# Patient Record
Sex: MALE | Race: WHITE
[De-identification: names, ages, dates, MRNs, and addresses within clinical notes are randomized per-mention and may not be internally consistent; named-entity substitution may affect disease eponyms.]

---

## 2019-12-27 NOTE — EDM.PDOC
ED HPI GENERAL MEDICAL PROBLEM





- General


Chief Complaint: Drug or Alcohol Abuse


Stated Complaint: OVERDOSE


Time Seen by Provider: 12/27/19 15:50


Source of Information: Reports: Patient, EMS


History Limitations: Reports: Altered Mental Status





- History of Present Illness


INITIAL COMMENTS - FREE TEXT/NARRATIVE: 





Patient is a 24-year-old male who was found with agonal and apneic breathing 

secondary to heroin overdose prior to arrival.  Patient was given bystander CPR 

before paramedic arrival.  Was given Narcan which she has reversed is apnea and 

loss of consciousness.  Patient is alert and oriented  in the emergency 

department and denies using any other drugs besides marijuana.  He denies any 

trauma.


Onset: Today, Sudden


Severity: Severe


Improves with: Reports: Other (Narcan)


Associated Symptoms: Reports: No Other Symptoms





- Related Data


 Allergies











Allergy/AdvReac Type Severity Reaction Status Date / Time


 


No Known Allergies Allergy   Verified 12/27/19 15:39











Home Meds: 


 Home Meds





. [No Known Home Meds]  12/27/19 [History]











Past Medical History





- Past Health History


Medical/Surgical History: Denies Medical/Surgical History





- Infectious Disease History


Infectious Disease History: Reports: None





Social & Family History





- Family History


Family Medical History: Noncontributory





- Tobacco Use


Smoking Status *Q: Current Every Day Smoker


Years of Tobacco use: 5


Packs/Tins Daily: 1





- Caffeine Use


Caffeine Use: Reports: Coffee





- Recreational Drug Use


Recreational Drug Use: Yes


Recreational Drug Type: Reports: Heroin, Marijuana/Hashish





ED ROS GENERAL





- Review of Systems


Review Of Systems: Comprehensive ROS is negative, except as noted in HPI.





- Physical Exam


Exam: See Below


Exam Limited By: No Limitations


General Appearance: Alert, No Apparent Distress


Head Exam: Atraumatic, Normocephalic


Neck: Normal Inspection


Respiratory/Chest: No Respiratory Distress, Lungs Clear, Normal Breath Sounds


Cardiovascular: Normal Peripheral Pulses, Regular Rate, Rhythm


GI/Abdominal: Normal Bowel Sounds, Soft, Non-Tender, No Organomegaly


Neuro Exam (Abbreviated): Alert, Oriented


Back Exam: Normal Inspection


Extremities: Normal Inspection


Psychiatric: Normal Affect, Normal Mood


Skin Exam: Warm, Dry, Intact





Course





- Vital Signs


Last Recorded V/S: 


 Last Vital Signs











Temp  35.3 C   12/27/19 15:40


 


Pulse  95   12/27/19 17:22


 


Resp  16   12/27/19 17:22


 


BP  129/56 L  12/27/19 17:22


 


Pulse Ox  98   12/27/19 17:22














- Orders/Labs/Meds


Orders: 


 Active Orders 24 hr











 Category Date Time Status


 


 DRUG SCREEN, URINE [URCHEM] Stat Lab  12/27/19 17:48 Received











Labs: 


 Laboratory Tests











  12/27/19 Range/Units





  15:43 


 


Ethyl Alcohol  <3  mg/dL











Meds: 


Medications














Discontinued Medications














Generic Name Dose Route Start Last Admin





  Trade Name Maria Ines  PRN Reason Stop Dose Admin


 


Ondansetron HCl  4 mg  12/27/19 15:57  12/27/19 16:05





  Zofran  IVPUSH  12/27/19 15:58  4 mg





  ONETIME ONE   Administration





     





     





     





     














- Re-Assessments/Exams


Free Text/Narrative Re-Assessment/Exam: 





12/27/19 17:58


Patient was observed for approximately 3 hours with no deterioration of his 

mental status.  Urging at this time to the care of his family who assured me 

they will watch him tonight.  Him to the ED if there is any change in his 

mental status.





Departure





- Departure


Time of Disposition: 17:59


Disposition: Home, Self-Care 01


Condition: Good


Clinical Impression: 


 Drug abuse, Accidental heroin overdose








- Discharge Information


Instructions:  Drug Overdose


Forms:  ED Department Discharge


Additional Instructions: 


The following information is given to patients seen in the emergency department 

who are being discharged to home. This information is to outline your options 

for follow-up care. We provide all patients seen in our emergency department 

with a follow-up referral.





The need for follow-up, as well as the timing and circumstances, are variable 

depending upon the specifics of your emergency department visit.





If you don't have a primary care physician on staff, we will provide you with a 

referral. We always advise you to contact your personal physician following an 

emergency department visit to inform them of the circumstance of the visit and 

for follow-up with them and/or the need for any referrals to a consulting 

specialist.





The emergency department will also refer you to a specialist when appropriate. 

This referral assures that you have the opportunity for follow-up care with a 

specialist. All of these measure are taken in an effort to provide you with 

optimal care, which includes your follow-up.





Under all circumstances we always encourage you to contact your private 

physician who remains a resource for coordinating your care. When calling for 

follow-up care, please make the office aware that this follow-up is from your 

recent emergency room visit. If for any reason you are refused follow-up, 

please contact the  Emergency 

Department at [phone] and asked to speak to the emergency department 

charge nurse.








Sepsis Event Note





- Evaluation


Sepsis Screening Result: No Definite Risk





- Focused Exam


Vital Signs: 


 Vital Signs











  Temp Pulse Resp BP Pulse Ox


 


 12/27/19 17:22   95  16  129/56 L  98


 


 12/27/19 16:05   85  16  123/71  99


 


 12/27/19 15:40  35.3 C  93  18  150/95 H  100











Date Exam was Performed: 12/27/19


Time Exam was Performed: 17:57





- My Orders


Last 24 Hours: 


My Active Orders





12/27/19 17:48


DRUG SCREEN, URINE [URCHEM] Stat 














- Assessment/Plan


Last 24 Hours: 


My Active Orders





12/27/19 17:48


DRUG SCREEN, URINE [URCHEM] Stat

## 2021-03-28 ENCOUNTER — HOSPITAL ENCOUNTER (EMERGENCY)
Dept: HOSPITAL 56 - MW.ED | Age: 26
Discharge: LEFT BEFORE DISCHARGE | End: 2021-03-28
Payer: COMMERCIAL

## 2021-03-28 ENCOUNTER — HOSPITAL ENCOUNTER (EMERGENCY)
Dept: HOSPITAL 56 - MW.ED | Age: 26
Discharge: HOME | End: 2021-03-28
Payer: COMMERCIAL

## 2021-03-28 DIAGNOSIS — S61.112A: Primary | ICD-10-CM

## 2021-03-28 DIAGNOSIS — Z88.4: ICD-10-CM

## 2021-03-28 DIAGNOSIS — Z72.0: ICD-10-CM

## 2021-03-28 DIAGNOSIS — S61.102A: Primary | ICD-10-CM

## 2021-03-28 DIAGNOSIS — Z23: ICD-10-CM
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
## 2021-03-28 PROCEDURE — 99283 EMERGENCY DEPT VISIT LOW MDM: CPT

## 2021-03-28 PROCEDURE — 11760 REPAIR OF NAIL BED: CPT

## 2021-03-28 NOTE — EDM.PDOC
ED HPI GENERAL MEDICAL PROBLEM





- General


Chief Complaint: Laceration


Stated Complaint: SMASHED RT THUMB


Time Seen by Provider: 03/28/21 20:42





- History of Present Illness


INITIAL COMMENTS - FREE TEXT/NARRATIVE: 





HISTORY AND PHYSICAL:





History of present illness:


This is a 25 old gentleman who presents ER today secondary to an injury to his 

left thumb that occurred while he was working on his vehicle.  Patient was seen 

here earlier today and had a negative x-ray.  Patient reports tetanus currently 

up-to-date.  Patient return to the ER but is yet to leave earlier today 

secondary to childcare issues.





Patient reports no neurological deficits.  Patient reports pain and discomfort 

isolated to his nailbed of his left thumb.





Review of systems: 


As per history of present illness and below otherwise all systems reviewed and 

negative.





Past medical history: 


As per history of present illness and as reviewed below otherwise 

noncontributory.





Surgical history: 


As per history of present illness and as reviewed below otherwise 

noncontributory.





Social history: 


No reported history of drug or alcohol abuse.





Family history: 


As per history of present illness and as reviewed below otherwise 

noncontributory.





Physical exam:





This patient was seen and evaluated during the 2020 SARS-CoV-2 novel coronavirus

pandemic period.  Community viral transmission is ongoing at time of this 

encounter and the emergency department is operating under pandemic response 

procedures.





Constitutional: Patient is oriented to person, place, and time.  Appears well-

developed and well-nourished.  No distress.


 HEENT: Moist mucous membranes


 Head: Normocephalic and atraumatic


 Eyes: Right eye exhibits no discharge.  Left eye exhibits no discharge.  No 

scleral icterus


 Neck: Normal range of motion.  No tracheal deviation present.


 Cardiovascular: Normal rate and regular rhythm.


 Pulmonary: Effort normal, no respiratory distress.


 Abdominal: No distention


 Musculoskeletal: Normal range of motion


 Neurologic: Alert and oriented to person, place and time.


 Skin: Pink, warm and dry.  


 Psychiatric: Normal mood and affect.  Behavior is normal.  Judgment and thought

content normal.


 Nursing note and vital signs have been reviewed





Patient's ER physical exam is significant for laceration through patient's 

proximal aspect of his left nailbed.  It appears that the proximal half of his 

fingernail has been avulsed off and is nonexistent and there is a laceration 

through the nailbed itself.  There is fragment of fingernail within the cuticle 

of the medial aspect of the nailbed itself.








Diagnostics:


X-ray reviewed from earlier today.  No fracture of thumb.





Therapeutics: Procedure note:








Wound has been irrigated with 1 L of NSS and soap scrub.


Utilizing 2 cc of 1% lidocaine local anesthesia was provided to the thumb 

itself.  Tourniquet was applied.


Fingernail was removed utilizing blunt dissection.


3x5.0 rapid absorbable gut was utilized to approximate edges of the fingernail 

bed.


Removed fingernail was trimmed and reinserted in through the nailbed to keep the

cuticle open and was Dermabond in place.











Assessment and plan:


25-year-old with a injury to his left thumb.  X-ray negative for fracture.  

Tetanus up-to-date.  Wound sutured and repaired as above.  Bulky dressing 

applied with Telfa dressing.  Patient be discharged with Keflex and ibuprofen.  

I have asked the patient to return to the ED so I can eyeball him in 2 to 3 days

since he does not have a family doctor who can go to.  I have also instructed 

him that if he is able to get a family doctor in the meantime that it would be 

preferable that he see his family doctor to establish care.





Definitive disposition and diagnosis as appropriate pending reevaluation and 

review of above.








  ** L thumb


Pain Score (Numeric/FACES): 6





- Related Data


                                    Allergies











Allergy/AdvReac Type Severity Reaction Status Date / Time


 


lidocaine [From Emla] Allergy  Other Verified 03/28/21 19:42


 


prilocaine [From Emla] Allergy  Other Verified 03/28/21 19:42











Home Meds: 


                                    Home Meds





Ibuprofen 600 mg PO Q6HR PRN #30 tablet 03/28/21 [Rx]


cephALEXin [Keflex] 500 mg PO Q8H #15 cap 03/28/21 [Rx]











Past Medical History





- Past Health History


Medical/Surgical History: Denies Medical/Surgical History


Cardiovascular History: Reports: None


Respiratory History: Reports: None


Gastrointestinal History: Reports: None


Genitourinary History: Reports: None


Neurological History: Reports: None


Psychiatric History: Reports: None


Hematologic History: Reports: None


Oncologic (Cancer) History: Reports: None


Dermatologic History: Reports: None





- Infectious Disease History


Infectious Disease History: Reports: None





- Past Surgical History


Male  Surgical History: Reports: Other (See Below)


Other Male  Surgeries/Procedures: varicose veins removed in groin patient 

reports


Other Musculoskeletal Surgeries/Procedures:: right ring finger surgery.





Social & Family History





- Family History


Family Medical History: No Pertinent Family History





- Caffeine Use


Caffeine Use: Reports: Coffee





- Recreational Drug Use


Recreational Drug Use: No





ED ROS GENERAL





- Review of Systems


Review Of Systems: See Below





ED EXAM, SKIN/RASH


Exam: See Below





ED SKIN PROCEDURES





- Laceration/Wound Repair


  ** Left Digit - 1st (Thumb)


Appearance: Irregular


Distal NVT: Neuro & Vascular Intact


Anesthetic Type: Local


Local Anesthesia - Lidocaine (Xylocaine): 1% Plain


Local Anesthetic Volume: 2cc


Skin Prep: Saline, Sterile Drape


Saline Irrigation (cc's): 1,000


Exploration/Debridement/Repair: Wound Explored, In a Bloodless Field, Explored 

to Base, Moderate Debridement, Wound Margins Revised, Multiple Flaps Aligned


Closed with: Sutures


Lac/Wound length In cm: 4


Suture Size: 5-0


# of Sutures: 3 (Rapid absorbable gut)


Suture Type: Interrupted





Course





- Vital Signs


Last Recorded V/S: 





                                Last Vital Signs











Temp  97.9 F   03/28/21 19:42


 


Pulse  79   03/28/21 19:42


 


Resp  18   03/28/21 19:42


 


BP  131/79   03/28/21 19:42


 


Pulse Ox  97   03/28/21 19:42














- Orders/Labs/Meds


Meds: 





Medications














Discontinued Medications














Generic Name Dose Route Start Last Admin





  Trade Name Maria Ines  PRN Reason Stop Dose Admin


 


Bacitracin  1 dose  03/28/21 20:39  03/28/21 20:53





  Bacitracin Oint 1 Gm U/D Packet  TOP  03/28/21 20:40  Not Given





  ONETIME ONE  


 


Lidocaine HCl  Confirm  03/28/21 19:54  03/28/21 20:53





  Lidocaine 1% 5 Ml Sdv  Administered  03/28/21 19:55  Not Given





  Dose  





  5 ml  





  .ROUTE  





  .STK-MED ONE  


 


Lidocaine HCl  5 ml  03/28/21 20:38  03/28/21 20:53





  Lidocaine 1% 5 Ml Sdv  INJECT  03/28/21 20:39  5 ml





  ONETIME ONE   Administration


 


Octyl Cyanoacrylate  Confirm  03/28/21 20:22  03/28/21 20:52





  Octyl 2-Cyanoacrylate 1 Applic Tube  Administered  03/28/21 20:23  Not Given





  Dose  





  2 applic  





  .ROUTE  





  .STK-MED ONE  


 


Octyl Cyanoacrylate  2 applic  03/28/21 20:38  03/28/21 20:50





  Octyl 2-Cyanoacrylate 1 Applic Tube  TOP  03/28/21 20:39  2 applic





  ONETIME ONE   Administration














Departure





- Departure


Time of Disposition: 21:09


Disposition: Home, Self-Care 01


Condition: Good


Clinical Impression: 


 Nailbed avulsion





Nailbed laceration, finger


Qualifiers:


 Encounter type: initial encounter Qualified Code(s): S61.319A - Laceration 

without foreign body of unspecified finger with damage to nail, initial 

encounter





Injury of left thumb


Qualifiers:


 Encounter type: initial encounter Qualified Code(s): S69.92XA - Unspecified 

injury of left wrist, hand and finger(s), initial encounter








- Discharge Information


Instructions:  Laceration Care, Adult


Referrals: 


PCP,None [Primary Care Provider] - 


Additional Instructions: 


You were seen and evaluated in the ER today secondary to an injury to your left 

thumb.  You will be given Keflex 500 mg to take 3 times a day for the next 5 

days to help prevent infection.  You also be given ibuprofen to assist with pain

and discomfort.  Please keep the bulky dressing on to protect your thumb.  He 

can remove it in 24 hours and just apply a Band-Aid.





You will need to follow-up in 2 days for wound check.  I will be available here 

in the ED after 7 PM on Tuesday for reevaluation.  When you come, please asked 

to speak to the charge nurse or myself so that we can evaluate your wound 

quickly for you.





The following information is given to patients seen in the emergency department 

who are being discharged to home. This information is to outline your options 

for follow-up care. We provide all patients seen in our emergency department 

with a follow-up referral.





The need for follow-up, as well as the timing and circumstances, are variable 

depending upon the specifics of your emergency department visit.





If you don't have a primary care physician on staff, we will provide you with a 

referral. We always advise you to contact your personal physician following an 

emergency department visit to inform them of the circumstance of the visit and 

for follow-up with them and/or the need for any referrals to a consulting 

specialist.





The emergency department will also refer you to a specialist when appropriate. 

This referral assures that you have the opportunity for follow-up care with a 

specialist. All of these measure are taken in an effort to provide you with 

optimal care, which includes your follow-up.





Under all circumstances we always encourage you to contact your private 

physician who remains a resource for coordinating your care. When calling for 

follow-up care, please make the office aware that this follow-up is from your 

recent emergency room visit. If for any reason you are refused follow-up, please

contact the [facility] Emergency Department

at [phone] and asked to speak to the emergency department charge nurse.





[facility]


[address] [place] [id number]


Phone: [phone]


Fax: [phone]








[address] [address]


Phone: [phone]


Fax: [phone]








Sepsis Event Note (ED)





- Evaluation


Sepsis Screening Result: No Definite Risk





- Focused Exam


Vital Signs: 





                                   Vital Signs











  Temp Pulse Resp BP Pulse Ox


 


 03/28/21 19:42  97.9 F  79  18  131/79  97

## 2021-03-28 NOTE — EDM.PDOC
ED HPI GENERAL MEDICAL PROBLEM





- General


Chief Complaint: Upper Extremity Injury/Pain


Stated Complaint: SMASHED LFT THUMB


Time Seen by Provider: 03/28/21 15:47


Source of Information: Reports: Patient


History Limitations: Reports: No Limitations





- History of Present Illness


INITIAL COMMENTS - FREE TEXT/NARRATIVE: 


HISTORY AND PHYSICAL:





History of present illness:


Patient is a 25-year-old male who presents to the ED today with concern of left 

thumb injury that occurred just prior to arrival to the emergency room.  Patient

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

vehicle and a piece of metal that he was working with.  Patient states that he 

wrapped his thumb up and immediately came to the emergency room.  Patient states

that he is not up-to-date on tetanus and would like to update this today.  

Patient states he has been fully able to move the thumb but does have pain with 

doing so.





Patient denies fever, chills, chest pain, shortness of breath, or cough. Denies 

headache, neck stiff ness, change in vision, syncope, or near syncope. Denies 

nausea, vomiting, abdominal pain, diarrhea, constipation, or dysuria. Has not 

noted any blood in urine or stool. Patient has been eating and drinking 

appropriately.





Review of systems: 


As per history of present illness and below otherwise all systems reviewed and 

negative.





Past medical history: 


As per history of present illness and as reviewed below otherwise 

noncontributory.





Surgical history: 


As per history of present illness and as reviewed below otherwise 

noncontributory.





Social history: 


See social history for further information





Family history: 


As per history of present illness and as reviewed below otherwise 

noncontributory.





Physical exam:


General: Patient is alert, oriented, and in no acute distress. Patient sitting 

comfortably on exam table.


HEENT: Atraumatic, normocephalic, pupils equal and reactive bilaterally, 

negative for conjunctival pallor or scleral icterus, mucous membranes moist, TMs

normal bilaterally, throat clear, neck supple, nontender, trachea midline. No 

drooling or trismus noted. No meningeal signs. No hot potato voice noted. 


Lungs: Clear to auscultation, breath sounds equal bilaterally, chest nontender.


Heart: S1S2, regular rate and rhythm without overt murmur


Abdomen: Soft, nondistended, nontender. Negative for masses or hepatosplenom

egaly. Negative for costovertebral tenderness.


Pelvis: Stable nontender.


Genitourinary: Deferred.


Rectal: Deferred.


Skin: Intact, warm, dry. No lesions or rashes noted.


Extremities: The base of the left thumb appears 1/2 avulsed with possible 

underlying laceration which is difficult to fully visualize due to pain. Patient

does have full ROM of the digit without deficit with intact sensation to light 

and deep touch.  Range of motion of the remaining left upper extremity without 

pain or deficit.  Radial pulses grossly intact of the left upper extremity with 

capillary refill less than 2 seconds.  Otherwise, atraumatic, negative for cords

or calf pain. Neurovascular unremarkable.


Neuro: Awake, alert, oriented. Cranial nerves II through XII unremarkable. 

Cerebellum unremarkable. Motor and sensory unremarkable throughout. Exam 

nonfocal.





Notes:


Patient left the ED AMA prior to discharge





Diagnostics:


Hand XR





Therapeutics:


Tdap (Patient did not receive any additional therapeutics as he left AMA prior)





Prescription:


Left AMA prior to discharge





Impression: 


Nailbed avulsion cannot r/o laceration, left thumb





Plan:


Patient left the ED AMA prior to discharge





Definitive disposition and diagnosis as appropriate pending reevaluation and 

review of above.





  ** 6


Pain Score (Numeric/FACES): 6





- Related Data


                                    Allergies











Allergy/AdvReac Type Severity Reaction Status Date / Time


 


lidocaine [From Emla] Allergy  Other Verified 03/28/21 16:23


 


prilocaine [From Emla] Allergy  Other Verified 03/28/21 16:23











Home Meds: 


                                    Home Meds





. [No Known Home Meds]  12/27/19 [History]











Past Medical History





- Past Health History


Medical/Surgical History: Denies Medical/Surgical History


Cardiovascular History: Reports: None


Respiratory History: Reports: None


Gastrointestinal History: Reports: None


Genitourinary History: Reports: None


Neurological History: Reports: None


Psychiatric History: Reports: None


Hematologic History: Reports: None


Oncologic (Cancer) History: Reports: None


Dermatologic History: Reports: None





- Infectious Disease History


Infectious Disease History: Reports: None





- Past Surgical History


Male  Surgical History: Reports: Other (See Below)


Other Male  Surgeries/Procedures: varicose veins removed in groin patient 

reports


Other Musculoskeletal Surgeries/Procedures:: right ring finger surgery.





Social & Family History





- Family History


Family Medical History: No Pertinent Family History





- Tobacco Use


Tobacco Use Status *Q: Current Every Day Tobacco User


Years of Tobacco use: 10


Packs/Tins Daily: 1





- Caffeine Use


Caffeine Use: Reports: Coffee, Energy Drinks, Soda





- Recreational Drug Use


Recreational Drug Use: No





Review of Systems





- Review of Systems


Review Of Systems: Comprehensive ROS is negative, except as noted in HPI.





ED EXAM, GENERAL





- Physical Exam


Exam: See Below (see dictation)





Course





- Vital Signs


Last Recorded V/S: 





                                Last Vital Signs











Temp  97.4 F   03/28/21 16:17


 


Pulse  87   03/28/21 16:17


 


Resp  18   03/28/21 16:17


 


BP  120/61   03/28/21 16:17


 


Pulse Ox  96   03/28/21 16:17














- Orders/Labs/Meds


Orders: 





                               Active Orders 24 hr











 Category Date Time Status


 


 Vaccines to be Administered [RC] PER UNIT ROUTINE Care  03/28/21 16:57 Active











Meds: 





Medications














Discontinued Medications














Generic Name Dose Route Start Last Admin





  Trade Name Kavehq  PRN Reason Stop Dose Admin


 


Bupivacaine HCl  10 ml  03/28/21 17:42 





  Bupivacaine 0.5% 10 Ml Sdv  INJECT  03/28/21 17:43 





  ONETIME ONE  


 


Diphtheria/Tetanus/Acell Pertussis  0.5 ml  03/28/21 16:57  03/28/21 17:55





  Diphtheria,Pertussis(Acell),Tetanus Vaccine 0.5 Ml Syringe  IM  03/28/21 16:58

  0.5 ml





  .ONCE ONE   Administration


 


Lidocaine HCl  5 ml  03/28/21 17:42 





  Lidocaine 1% 5 Ml Sdv  INJECT  03/28/21 17:43 





  ONETIME ONE  














Departure





- Departure


Time of Disposition: 18:04


Disposition: Against Medical Advice 07


Clinical Impression: 


 Nailbed avulsion








- Discharge Information


Referrals: 


PCP,None [Primary Care Provider] - 


Additional Instructions: 


Patient left the ED AGAINST MEDICAL ADVICE prior to discharge





Sepsis Event Note (ED)





- Evaluation


Sepsis Screening Result: No Definite Risk





- Focused Exam


Vital Signs: 





                                   Vital Signs











  Temp Pulse Resp BP Pulse Ox


 


 03/28/21 16:17  97.4 F  87  18  120/61  96














- My Orders


Last 24 Hours: 





My Active Orders





03/28/21 16:57


Vaccines to be Administered [RC] PER UNIT ROUTINE 














- Assessment/Plan


Last 24 Hours: 





My Active Orders





03/28/21 16:57


Vaccines to be Administered [RC] PER UNIT ROUTINE

## 2021-03-28 NOTE — CR
INDICATION:



Injury to left thumb. 



TECHNIQUE:



Left hand, three views. 



COMPARISON:



None 



FINDINGS:



Bones: Alignment is normal. No fractures or bone lesions.  



Joint spaces: No articular erosion. No periarticular osteopenia. 



Soft tissues: No radiopaque foreign body. 



IMPRESSION:



No fracture or malalignment.



Dictated by Jerry Gutierrez MD @ Mar 28 2021  4:57PM



Signed by Dr. Jerry Gutierrez @ Mar 28 2021  4:59PM